# Patient Record
Sex: MALE | Race: BLACK OR AFRICAN AMERICAN
[De-identification: names, ages, dates, MRNs, and addresses within clinical notes are randomized per-mention and may not be internally consistent; named-entity substitution may affect disease eponyms.]

---

## 2018-08-08 ENCOUNTER — HOSPITAL ENCOUNTER (EMERGENCY)
Dept: HOSPITAL 62 - ER | Age: 23
Discharge: HOME | End: 2018-08-08
Payer: COMMERCIAL

## 2018-08-08 VITALS — DIASTOLIC BLOOD PRESSURE: 77 MMHG | SYSTOLIC BLOOD PRESSURE: 140 MMHG

## 2018-08-08 DIAGNOSIS — R10.13: ICD-10-CM

## 2018-08-08 DIAGNOSIS — F12.10: ICD-10-CM

## 2018-08-08 DIAGNOSIS — K29.00: Primary | ICD-10-CM

## 2018-08-08 DIAGNOSIS — F17.200: ICD-10-CM

## 2018-08-08 DIAGNOSIS — J45.909: ICD-10-CM

## 2018-08-08 DIAGNOSIS — R07.9: ICD-10-CM

## 2018-08-08 DIAGNOSIS — R13.10: ICD-10-CM

## 2018-08-08 PROCEDURE — 71046 X-RAY EXAM CHEST 2 VIEWS: CPT

## 2018-08-08 PROCEDURE — 96372 THER/PROPH/DIAG INJ SC/IM: CPT

## 2018-08-08 PROCEDURE — 99284 EMERGENCY DEPT VISIT MOD MDM: CPT

## 2018-08-08 NOTE — ER DOCUMENT REPORT
ED GI/





- General


Chief Complaint: Epigastric Pain


Stated Complaint: UPPER ABDOMINAL PAIN


Time Seen by Provider: 08/08/18 19:19


Mode of Arrival: Ambulatory


Information source: Patient


Notes: 





Chief complaint: abdominal pain:








History of complain:( obtained from----patient) 23 years old male with a 

history of on and off dysphagia, presents today with sudden onset of epigastric 

to lower chest pain, after eating a fairly large piece of chicken.  The pain is 

more of a burning sensation.  Nonradiating.  Not associated with any nausea 

vomiting or drooling.


Denies any other constitutional symptoms








Onset: Sudden onset


Duration: Prior to arrival


Severity: Moderate to severe


Quality: Burning sensation


Context: As above


Exacerbating factor and relieving factors: As above











REVIEW OF SYSTEMS:


CONSTITUTIONAL :  Denies fever,  chills, or sweats.  Denies recent illness.


EENT:   Denies eye, ear, throat, or mouth pain or symptoms.  Denies nasal or 

sinus congestion or discharge.  Denies throat, tongue, or mouth swelling or 

difficulty swallowing.


CARDIOVASCULAR:  Denies chest pain.  Denies palpitations or racing or irregular 

heart beat.  Denies ankle edema.


RESPIRATORY:  Denies cough, cold, or chest congestion.  Denies shortness of 

breath, difficulty breathing, or wheezing.


GASTROINTESTINAL:  Denies  distention.  Denies nausea, vomiting, or diarrhea.  

Denies blood in vomitus, stools, or per rectum.  Denies black, tarry stools.  

Denies constipation. 


GENITOURINARY:  Denies difficulty urinating, painful urination, burning, 

frequency, blood in urine, or discharge.


FEMALE  GENITOURINARY:  Denies vaginal bleeding, heavy or abnormal periods, 

irregular periods.  Denies vaginal discharge or odor. 


MUSCULOSKELETAL:  Denies back or neck pain or stiffness.  Denies joint pain or 

swelling.


SKIN:   Denies rash, lesions or sores.


HEMATOLOGIC :   Denies easy bruising or bleeding.


LYMPHATIC:  Denies swollen, enlarged glands.


NEUROLOGICAL:  Denies confusion or altered mental status.  Denies passing out 

or loss of consciousness.  Denies dizziness or lightheadedness.  Denies 

headache.  Denies weakness or paralysis or loss of use of either side.  Denies 

problems with gait or speech.  Denies sensory loss, numbness, or tingling.  

Denies seizures.


PSYCHIATRIC:  Denies anxiety or stress.  Denies depression, suicidal ideation, 

or homicidal ideation.





ALL OTHER SYSTEMS REVIEWED AND NEGATIVE.











PHYSICAL EXAMINATION:





GENERAL: Well-appearing, well-nourished and  moderately in acute distress.





HEAD: Atraumatic, normocephalic.





EYES: Pupils equal round and reactive to light, extraocular movements intact, 

conjunctiva are normal.





ENT: Nares patent, oropharynx clear without exudates.  Moist mucous membranes.





NECK: Normal range of motion, supple without lymphadenopathy





LUNGS: Breath sounds clear to auscultation bilaterally and equal.  No wheezes 

rales or rhonchi.





HEART: Regular rate and rhythm without murmurs





ABDOMEN: Soft, nontender, nondistended abdomen.  No guarding, no rebound.  No 

masses appreciated.





Female : deferred





Musculoskeletal: Normal range of motion, no pitting or edema.  No cyanosis.





NEUROLOGICAL: Cranial nerves grossly intact.  Normal speech, normal gait.  

Normal sensory, motor exams





PSYCH: Normal mood, normal affect.





SKIN: Warm, Dry, normal turgor, no rashes or lesions noted.

















Dictation was performed using Dragon voice recognition software


TRAVEL OUTSIDE OF THE [place] IN LAST 30 DAYS: No





- HPI


Notes: 





08/08/18 20:23


Dictated





- Related Data


Allergies/Adverse Reactions: 


 





No Known Allergies Allergy (Unverified 08/08/18 18:57)


 











Past Medical History





- Social History


Smoking Status: Current Every Day Smoker


Chew tobacco use (# tins/day): No


Frequency of alcohol use: Rare


Drug Abuse: Marijuana


Lives with: Family


Family History: Reviewed & Not Pertinent


Patient has suicidal ideation: No


Patient has homicidal ideation: No


Pulmonary Medical History: Reports: Hx Asthma


Renal/ Medical History: Denies: Hx Peritoneal Dialysis


Past Surgical History: Reports: Hx Tonsillectomy





Review of Systems





- Review of Systems


Notes: 





Dictated





Physical Exam





- Vital signs


Vitals: 





 











Temp Pulse Resp BP Pulse Ox


 


 98.1 F   97   20   140/77 H  96 


 


 08/08/18 19:04  08/08/18 19:04  08/08/18 19:04  08/08/18 19:04  08/08/18 19:04














- Notes


Notes: 





Dictated





Course





- Re-evaluation


Re-evalutation: 





08/08/18 20:24


He was given glucagon and Ativan, and also GI cocktail.  Symptoms improved.  

Discharge home with follow-up with the gastroenterologist.





- Vital Signs


Vital signs: 





 











Temp Pulse Resp BP Pulse Ox


 


 98.1 F   97   20   140/77 H  96 


 


 08/08/18 19:04  08/08/18 19:04  08/08/18 19:04  08/08/18 19:04  08/08/18 19:04














- Diagnostic Test


Radiology reviewed: Reports reviewed - Radiologist reported as questionable 

pneumonia.  But patient has no symptoms of it.





Discharge





- Discharge


Clinical Impression: 


Gastritis


Qualifiers:


 Gastritis type: unspecified gastritis Chronicity: acute Gastritis bleeding: 

without bleeding Qualified Code(s): K29.00 - Acute gastritis without bleeding





Dysphagia


Qualifiers:


 Dysphagia type: esophageal phase Qualified Code(s): R13.10 - Dysphagia, 

unspecified





Condition: Fair


Disposition: HOME, SELF-CARE


Instructions:  Dysphagia ([facility]), Antacid Therapy ([facility])


Prescriptions: 


Lansoprazole [Prevacid 30 Mg Odt Tablet] 30 mg PO ACBRKFST #30 tab.rap


Sucralfate [Carafate 1 gm Tablet] 1 gm PO ACHS #30 tablet


Referrals: 


LOCALMD,NO [Primary Care Provider] - Follow up as needed

## 2018-08-08 NOTE — RADIOLOGY REPORT (SQ)
EXAM DESCRIPTION:  CHEST 2 VIEWS



COMPLETED DATE/TIME:  8/8/2018 7:47 pm



REASON FOR STUDY:  chest pain



COMPARISON:  None.



EXAM PARAMETERS:  NUMBER OF VIEWS: two views

TECHNIQUE: Digital Frontal and Lateral radiographic views of the chest acquired.

RADIATION DOSE: NA

LIMITATIONS: none



FINDINGS:  LUNGS AND PLEURA: Ill-defined retrocardiac opacity on the left.  Cannot exclude a minimal 
infiltrate seen posteriorly and inferiorly on the lateral view.

MEDIASTINUM AND HILAR STRUCTURES: No masses or contour abnormalities.

HEART AND VASCULAR STRUCTURES: Heart normal size.  No evidence for failure.

BONES: No acute findings.

HARDWARE: None in the chest.

OTHER: No other significant finding.



IMPRESSION:  Possible limited left lower lobe pneumonia.



TECHNICAL DOCUMENTATION:  JOB ID:  [id number]

 2011 [employer]- All Rights Reserved



Reading location - IP/workstation name: [email or web address]

## 2018-08-09 ENCOUNTER — HOSPITAL ENCOUNTER (EMERGENCY)
Dept: HOSPITAL 62 - ER | Age: 23
Discharge: ANOTHER HOSPITAL | End: 2018-08-09
Payer: COMMERCIAL

## 2018-08-09 VITALS — DIASTOLIC BLOOD PRESSURE: 51 MMHG | SYSTOLIC BLOOD PRESSURE: 129 MMHG

## 2018-08-09 DIAGNOSIS — K22.3: ICD-10-CM

## 2018-08-09 DIAGNOSIS — R00.0: ICD-10-CM

## 2018-08-09 DIAGNOSIS — R10.10: ICD-10-CM

## 2018-08-09 DIAGNOSIS — E86.0: Primary | ICD-10-CM

## 2018-08-09 LAB
ADD MANUAL DIFF: NO
ALBUMIN SERPL-MCNC: 4.8 G/DL (ref 3.5–5)
ALP SERPL-CCNC: 54 U/L (ref 38–126)
ALT SERPL-CCNC: 44 U/L (ref 21–72)
ANION GAP SERPL CALC-SCNC: 21 MMOL/L (ref 5–19)
AST SERPL-CCNC: 45 U/L (ref 17–59)
BASOPHILS # BLD AUTO: 0 10^3/UL (ref 0–0.2)
BASOPHILS NFR BLD AUTO: 0.1 % (ref 0–2)
BILIRUB DIRECT SERPL-MCNC: 0.3 MG/DL (ref 0–0.4)
BILIRUB SERPL-MCNC: 1.9 MG/DL (ref 0.2–1.3)
BUN SERPL-MCNC: 12 MG/DL (ref 7–20)
CALCIUM: 10.6 MG/DL (ref 8.4–10.2)
CHLORIDE SERPL-SCNC: 107 MMOL/L (ref 98–107)
CK MB SERPL-MCNC: 4.26 NG/ML (ref ?–4.55)
CK SERPL-CCNC: 1001 U/L (ref 55–170)
CO2 SERPL-SCNC: 19 MMOL/L (ref 22–30)
EOSINOPHIL # BLD AUTO: 0 10^3/UL (ref 0–0.6)
EOSINOPHIL NFR BLD AUTO: 0 % (ref 0–6)
ERYTHROCYTE [DISTWIDTH] IN BLOOD BY AUTOMATED COUNT: 14.3 % (ref 11.5–14)
GLUCOSE SERPL-MCNC: 135 MG/DL (ref 75–110)
HCT VFR BLD CALC: 49.4 % (ref 37.9–51)
HGB BLD-MCNC: 16.6 G/DL (ref 13.5–17)
LIPASE SERPL-CCNC: 47.2 U/L (ref 23–300)
LYMPHOCYTES # BLD AUTO: 0.5 10^3/UL (ref 0.5–4.7)
LYMPHOCYTES NFR BLD AUTO: 7.5 % (ref 13–45)
MCH RBC QN AUTO: 27.4 PG (ref 27–33.4)
MCHC RBC AUTO-ENTMCNC: 33.6 G/DL (ref 32–36)
MCV RBC AUTO: 81 FL (ref 80–97)
MONOCYTES # BLD AUTO: 0.4 10^3/UL (ref 0.1–1.4)
MONOCYTES NFR BLD AUTO: 5.2 % (ref 3–13)
NEUTROPHILS # BLD AUTO: 6 10^3/UL (ref 1.7–8.2)
NEUTS SEG NFR BLD AUTO: 87.2 % (ref 42–78)
PLATELET # BLD: 258 10^3/UL (ref 150–450)
POTASSIUM SERPL-SCNC: 4.5 MMOL/L (ref 3.6–5)
PROT SERPL-MCNC: 8 G/DL (ref 6.3–8.2)
RBC # BLD AUTO: 6.07 10^6/UL (ref 4.35–5.55)
SODIUM SERPL-SCNC: 146.5 MMOL/L (ref 137–145)
TOTAL CELLS COUNTED % (AUTO): 100 %
TROPONIN I SERPL-MCNC: < 0.012 NG/ML
WBC # BLD AUTO: 6.9 10^3/UL (ref 4–10.5)

## 2018-08-09 PROCEDURE — 36415 COLL VENOUS BLD VENIPUNCTURE: CPT

## 2018-08-09 PROCEDURE — 74150 CT ABDOMEN W/O CONTRAST: CPT

## 2018-08-09 PROCEDURE — 93005 ELECTROCARDIOGRAM TRACING: CPT

## 2018-08-09 PROCEDURE — 84484 ASSAY OF TROPONIN QUANT: CPT

## 2018-08-09 PROCEDURE — 71045 X-RAY EXAM CHEST 1 VIEW: CPT

## 2018-08-09 PROCEDURE — 85025 COMPLETE CBC W/AUTO DIFF WBC: CPT

## 2018-08-09 PROCEDURE — 96361 HYDRATE IV INFUSION ADD-ON: CPT

## 2018-08-09 PROCEDURE — 83690 ASSAY OF LIPASE: CPT

## 2018-08-09 PROCEDURE — 96376 TX/PRO/DX INJ SAME DRUG ADON: CPT

## 2018-08-09 PROCEDURE — 96365 THER/PROPH/DIAG IV INF INIT: CPT

## 2018-08-09 PROCEDURE — 93010 ELECTROCARDIOGRAM REPORT: CPT

## 2018-08-09 PROCEDURE — 99291 CRITICAL CARE FIRST HOUR: CPT

## 2018-08-09 PROCEDURE — 82553 CREATINE MB FRACTION: CPT

## 2018-08-09 PROCEDURE — 71250 CT THORAX DX C-: CPT

## 2018-08-09 PROCEDURE — 82550 ASSAY OF CK (CPK): CPT

## 2018-08-09 PROCEDURE — 80053 COMPREHEN METABOLIC PANEL: CPT

## 2018-08-09 PROCEDURE — 96375 TX/PRO/DX INJ NEW DRUG ADDON: CPT

## 2018-08-09 PROCEDURE — 74220 X-RAY XM ESOPHAGUS 1CNTRST: CPT

## 2018-08-09 NOTE — RADIOLOGY REPORT (SQ)
EXAM DESCRIPTION:  CT CHEST WITHOUT; CT ABDOMEN NO ORAL OR IV



COMPLETED DATE/TIME:  8/9/2018 11:26 am



REASON FOR STUDY:  Mediastinal free air, upper abdominal pain



COMPARISON:  Chest film 8/9/2018, esophagram 8/9/2018.



TECHNIQUE:  CT scan of the chest performed without intravenous contrast using helical scanning techni
que.  Images reviewed with lung, soft tissue and bone windows. Reconstructed coronal and sagittal MPR
 images reviewed.  All images stored on PACS.

CT scan of the abdomen performed without intravenous contrast and immediately post upper GI/ omnipaqu
e swallow using helical scanning technique with dynamic intravenous contrast injection.  Images revie
wed with lung, soft tissue and bone windows.  Reconstructed coronal and sagittal MPR images reviewed.
  All images stored on PACS.

All CT scanners at this facility use dose modulation, iterative reconstruction, and/or weight based d
osing when appropriate to reduce radiation dose to as low as reasonably achievable (ALARA).

[facility]: Dose Right  [facility]: CareDose    [facility]: Dose Right    [facility]: Teradose 4D    [facility]: Smart Technologies



RADIATION DOSE:  CT Rad equipment meets quality standard of care and radiation dose reduction techniq
ues were employed. CTDIvol: 22.2 - 25.6 mGy. DLP: 2080 mGy-cm. mGy.



LIMITATIONS:  No technical limitations.



FINDINGS:  This study was performed immediately after a swallowing fluoroscopic study with Omnipaque 
300.

 On the fluoroscopic study, there is immediate leakage of contrast from the distal esophagus at the G
E junction at the level of the hemidiaphragm, fluoroscopically there is accumulation of contrast both
 above and below the left hemidiaphragm.

On the CT chest and abdomen, there is still a small amount of water-soluble contrast within the esoph
ageal lumen. At the level of T7, there is extravasation of oral contrast from the esophagus into the 
posterior mediastinal soft tissues, with a pocket of air and contrast measuring about 8 x 5 cm in siz
e.  Extravasated contrast tracks around the lateral aspect of the descending thoracic aorta.  There i
s tracking of contrast into the upper abdominal cavity through the esophageal hiatus, with a second p
ocket of extraluminal air and contrast tracking between the gastric cardia and spleen, measuring abou
t 8 x 7 cm in size.

Retroperitoneal air bubbles track into the left anterior pararenal space ventral to the left kidney. 
 No extravasated contrast is seen this far inferiorly.

There are small bilateral pleural effusions.  Minimal bibasilar atelectasis.  Moderate air in the med
iastinum along the pericardium, extending up through the thoracic inlet, and into the supraclavicular
 soft tissues and deep neck soft tissues.  Small amount of air in the epidural veins along the cervic
al spinal canal.

These findings were called to Dr. Lynch in the emergency room, 1115 hours, 8/9/2018.

CHEST:

AXILLAE: No adenopathy.

CHEST WALL: Supraclavicular air bilaterally

LUNGS: Minimal bibasilar atelectasis

PLEURA: Small bilateral pleural effusions

THYROID: No masses or significant asymmetry.

HILAR AND MEDIASTINAL STRUCTURES: As above

AORTA AND GREAT VESSELS: No aneurysm.

HEART: Air in the mediastinum surrounding the heart

HARDWARE AND LIFELINES: None.

BONES: No significant finding.

OTHER: No other significant finding.

ABDOMEN AND PELVIS:

LIVER: Normal size. No masses.  No dilated ducts.

SPLEEN: Normal size.  No focal lesions.

PANCREAS: No masses.  No significant calcifications.  No adjacent inflammation or peripancreatic flui
d collections.  Pancreatic duct not dilated.

GALLBLADDER: No identified stones by CT criteria. No inflammatory changes to suggest cholecystitis.

ADRENAL GLANDS: No significant masses or asymmetry.

RIGHT KIDNEY AND URETER: No solid masses. Assessment limited by lack of IV contrast. No significant c
alcification. No hydronephrosis or hydroureter.

LEFT KIDNEY AND URETER: No solid masses. Assessment limited by lack of IV contrast. No significant ca
lcification. No hydronephrosis or hydroureter.  There is air in the left retroperitoneal fat along th
e left anterior pararenal space.

AORTA AND VESSELS: No aneurysm.

RETROPERITONEUM: Extravasated oral contrast from distal esophageal perforation, extending into the so
ft tissues around the gastric cardia, and into the retroperitoneum between the GE junction and spleen
.

APPENDIX: Not in the field of view

LARGE AND SMALL BOWEL: No dilatation.  No masses.  No wall thickening.

ABDOMINAL WALL: No hernia or masses.

PERITONEAL CAVITY: No free air.  No free fluid.  No peritoneal implants or masses.

BONES:  Unremarkable.

OTHER: No other significant finding.



IMPRESSION:  Leakage of water-soluble oral contrast from a distal esophageal perforation, extending b
oth into the mediastinum as well as into the left upper quadrant retroperitoneum.



COMMENT:   Pertinent findings on the imaging study reported as a CRITICAL RESULT to LIO LYNCH MD
 at11:15 on 8/9/2018.

Category of Critical Result: Perforated esophagus



TECHNICAL DOCUMENTATION:  JOB ID:  [id number]

Quality ID # 436: Final reports with documentation of one or more dose reduction techniques (e.g., Au
tomated exposure control, adjustment of the mA and/or kV according to patient size, use of iterative 
reconstruction technique)

 2011 [employer]- All Rights Reserved



Reading location - IP/workstation name: [email or web address]

## 2018-08-09 NOTE — ER DOCUMENT REPORT
ED GI/





<LIO LYNCH - Last Filed: 08/09/18 11:51>





- General


Mode of Arrival: Ambulatory


Information source: Patient


TRAVEL OUTSIDE OF THE [place] IN LAST 30 DAYS: No





<CIRILO CORLEY - Last Filed: 08/09/18 12:28>





- General


Chief Complaint: Abdominal Pain


Stated Complaint: STOMACH PAIN


Time Seen by Provider: 08/09/18 07:54


Notes: 





23-year-old male who presents to the emergency department today with complaints 

of upper abdominal pain.  Patient was seen yesterday for a similar pain and 

given glucagon, GI cocktail, and Ativan.  Patient states at that time his 

symptoms seemed to subside until getting home at about 2100.  Patient states 

when the symptoms began again at 2100 there were sharp, stabbing, and 

persistent pain.  Patient states it hurts when he breathes in. Patient states 

he has not been drinking anything because after consuming liquids he has an 

intense pain for 30-45 minutes.  Patient denies vomiting. (CIRILO CORLEY)





- Related Data


Allergies/Adverse Reactions: 


 





No Known Allergies Allergy (Verified 08/09/18 07:34)


 











Past Medical History





- General


Information source: Patient





- Social History


Smoking Status: Unknown if Ever Smoked


Chew tobacco use (# tins/day): No


Frequency of alcohol use: None


Drug Abuse: None


Family History: Reviewed & Not Pertinent


Patient has suicidal ideation: No


Patient has homicidal ideation: No


Pulmonary Medical History: Reports: Hx Asthma


Renal/ Medical History: Denies: Hx Peritoneal Dialysis


Past Surgical History: Reports: Hx Tonsillectomy





<CIRILO CORLEY - Last Filed: 08/09/18 12:28>





Review of Systems





- Review of Systems


Constitutional: No symptoms reported


EENT: No symptoms reported


Cardiovascular: No symptoms reported


Respiratory: No symptoms reported


Gastrointestinal: See HPI, Abdominal pain - upper abdominal pain


Genitourinary: No symptoms reported


Male Genitourinary: No symptoms reported


Musculoskeletal: No symptoms reported


Skin: No symptoms reported


Hematologic/Lymphatic: No symptoms reported


Neurological/Psychological: No symptoms reported


-: Yes All other systems reviewed and negative





<CIRILO CORLEY - Last Filed: 08/09/18 12:28>





Physical Exam





<LIO LYNCH - Last Filed: 08/09/18 11:51>





<CIRILO CORLEY - Last Filed: 08/09/18 12:28>





- Vital signs


Vitals: 


 











Temp Pulse Resp BP Pulse Ox


 


 99.4 F   142 H  22 H  150/56 H  96 


 


 08/09/18 07:42  08/09/18 07:42  08/09/18 07:42  08/09/18 07:42  08/09/18 07:42














- Notes


Notes: 





Physical Exam:


 


General: Alert, appears well. 


 


HEENT: Normocephalic. Atraumatic. PERRL. Extraocular movements intact. 

Oropharynx clear. Dry mucous membranes.


 


Neck: Supple. Non-tender.


 


Respiratory: Tachypneic into the 30s, shallow breathing with inspiratory phase 

cut off secondary to pain.  No right upper quadrant tenderness to palpation.  

Clear and equal breath sounds bilaterally.


 


Cardiovascular: Regular rate and rhythm. 


 


Abdominal: Mid to upper abdominal tenderness with palpation. No distension. 

Normal Bowel Sounds. 


 


Back: Non-tender. No deformity or step off.


 


Extremities: Moves all four extremities.


Upper extremities: Normal inspection. Normal ROM.  


Lower extremities: Normal inspection. No edema. Normal ROM.


 


Neurological: Normal cognition. AAOx4. Normal speech.  


 


Psychological: Normal affect. Normal Mood. 


 


Skin: Warm. Dry. Normal color. (CIRILO CORLEY)





Course





- Laboratory


Result Diagrams: 


 08/09/18 08:18





 08/09/18 08:18





- Diagnostic Test


Radiology reviewed: Reports reviewed - Esophageal rupture





- EKG Interpretation by Me


EKG shows normal: Sinus rhythm, Axis, Intervals, QRS Complexes, ST-T Waves


Rate: Tachycardia - 132


Rhythm: PVC's - Ventricular trigeminy





- Consults


  ** Dr. Cristina


Time consulted: 11:45


Consulted provider: other - Will accept at [facility]--plan to be direct to the OR.





<LIO LYNHC - Last Filed: 08/09/18 11:51>





- Laboratory


Result Diagrams: 


 08/09/18 08:18





 08/09/18 08:18





<CIRILO CORLEY - Last Filed: 08/09/18 12:28>





- Vital Signs


Vital signs: 


 











Temp Pulse Resp BP Pulse Ox


 


 99.4 F   142 H  33 H  150/56 H  96 


 


 08/09/18 07:42  08/09/18 07:42  08/09/18 11:54  08/09/18 07:42  08/09/18 11:54














- Laboratory


Laboratory results interpreted by me: 


 











  08/09/18 08/09/18 08/09/18





  08:18 08:18 08:18


 


RBC  6.07 H  


 


RDW  14.3 H  


 


Seg Neutrophils %  87.2 H  


 


Lymphocytes %  7.5 L  


 


Sodium   146.5 H 


 


Carbon Dioxide   19 L 


 


Anion Gap   21 H 


 


Creatinine   1.30 H 


 


Glucose   135 H 


 


Calcium   10.6 H 


 


Total Bilirubin   1.9 H 


 


Creatine Kinase    1001 H














Critical Care Note





- Critical Care Note


Total time excluding time spent on procedures (mins): 40





<LIO LYNCH - Last Filed: 08/09/18 11:51>





Discharge





<LIO LYNCH - Last Filed: 08/09/18 11:51>





<CIRILO CORLEY - Last Filed: 08/09/18 12:28>





- Discharge


Clinical Impression: 


 Esophageal rupture, Tachycardia, Dehydration





Scribe Attestation: 





08/09/18 10:21


I personally performed the services described in the documentation, reviewed 

and edited the documentation which was dictated to the scribe in my presence, 

and it accurately records my words and actions. (CIRILO CORLEY)





Scribe Documentation





- Scribe


Written by Scribe:: Aleksandr Mason, 8/9/2018 1228


acting as scribe for :: Karena





<CIRILO CORLEY - Last Filed: 08/09/18 12:28>

## 2018-08-09 NOTE — RADIOLOGY REPORT (SQ)
EXAM DESCRIPTION:  CT CHEST WITHOUT; CT ABDOMEN NO ORAL OR IV



COMPLETED DATE/TIME:  8/9/2018 11:26 am



REASON FOR STUDY:  Mediastinal free air, upper abdominal pain



COMPARISON:  Chest film 8/9/2018, esophagram 8/9/2018.



TECHNIQUE:  CT scan of the chest performed without intravenous contrast using helical scanning techni
que.  Images reviewed with lung, soft tissue and bone windows. Reconstructed coronal and sagittal MPR
 images reviewed.  All images stored on PACS.

CT scan of the abdomen performed without intravenous contrast and immediately post upper GI/ omnipaqu
e swallow using helical scanning technique with dynamic intravenous contrast injection.  Images revie
wed with lung, soft tissue and bone windows.  Reconstructed coronal and sagittal MPR images reviewed.
  All images stored on PACS.

All CT scanners at this facility use dose modulation, iterative reconstruction, and/or weight based d
osing when appropriate to reduce radiation dose to as low as reasonably achievable (ALARA).

[facility]: Dose Right  [facility]: CareDose    [facility]: Dose Right    [facility]: Teradose 4D    [facility]: [employer]



RADIATION DOSE:  CT Rad equipment meets quality standard of care and radiation dose reduction techniq
ues were employed. CTDIvol: 22.2 - 25.6 mGy. DLP: 2080 mGy-cm. mGy.



LIMITATIONS:  No technical limitations.



FINDINGS:  This study was performed immediately after a swallowing fluoroscopic study with Omnipaque 
300.

 On the fluoroscopic study, there is immediate leakage of contrast from the distal esophagus at the G
E junction at the level of the hemidiaphragm, fluoroscopically there is accumulation of contrast both
 above and below the left hemidiaphragm.

On the CT chest and abdomen, there is still a small amount of water-soluble contrast within the esoph
ageal lumen. At the level of T7, there is extravasation of oral contrast from the esophagus into the 
posterior mediastinal soft tissues, with a pocket of air and contrast measuring about 8 x 5 cm in siz
e.  Extravasated contrast tracks around the lateral aspect of the descending thoracic aorta.  There i
s tracking of contrast into the upper abdominal cavity through the esophageal hiatus, with a second p
ocket of extraluminal air and contrast tracking between the gastric cardia and spleen, measuring abou
t 8 x 7 cm in size.

Retroperitoneal air bubbles track into the left anterior pararenal space ventral to the left kidney. 
 No extravasated contrast is seen this far inferiorly.

There are small bilateral pleural effusions.  Minimal bibasilar atelectasis.  Moderate air in the med
iastinum along the pericardium, extending up through the thoracic inlet, and into the supraclavicular
 soft tissues and deep neck soft tissues.  Small amount of air in the epidural veins along the cervic
al spinal canal.

These findings were called to Dr. Lynch in the emergency room, 1115 hours, 8/9/2018.

CHEST:

AXILLAE: No adenopathy.

CHEST WALL: Supraclavicular air bilaterally

LUNGS: Minimal bibasilar atelectasis

PLEURA: Small bilateral pleural effusions

THYROID: No masses or significant asymmetry.

HILAR AND MEDIASTINAL STRUCTURES: As above

AORTA AND GREAT VESSELS: No aneurysm.

HEART: Air in the mediastinum surrounding the heart

HARDWARE AND LIFELINES: None.

BONES: No significant finding.

OTHER: No other significant finding.

ABDOMEN AND PELVIS:

LIVER: Normal size. No masses.  No dilated ducts.

SPLEEN: Normal size.  No focal lesions.

PANCREAS: No masses.  No significant calcifications.  No adjacent inflammation or peripancreatic flui
d collections.  Pancreatic duct not dilated.

GALLBLADDER: No identified stones by CT criteria. No inflammatory changes to suggest cholecystitis.

ADRENAL GLANDS: No significant masses or asymmetry.

RIGHT KIDNEY AND URETER: No solid masses. Assessment limited by lack of IV contrast. No significant c
alcification. No hydronephrosis or hydroureter.

LEFT KIDNEY AND URETER: No solid masses. Assessment limited by lack of IV contrast. No significant ca
lcification. No hydronephrosis or hydroureter.  There is air in the left retroperitoneal fat along th
e left anterior pararenal space.

AORTA AND VESSELS: No aneurysm.

RETROPERITONEUM: Extravasated oral contrast from distal esophageal perforation, extending into the so
ft tissues around the gastric cardia, and into the retroperitoneum between the GE junction and spleen
.

APPENDIX: Not in the field of view

LARGE AND SMALL BOWEL: No dilatation.  No masses.  No wall thickening.

ABDOMINAL WALL: No hernia or masses.

PERITONEAL CAVITY: No free air.  No free fluid.  No peritoneal implants or masses.

BONES:  Unremarkable.

OTHER: No other significant finding.



IMPRESSION:  Leakage of water-soluble oral contrast from a distal esophageal perforation, extending b
oth into the mediastinum as well as into the left upper quadrant retroperitoneum.



COMMENT:   Pertinent findings on the imaging study reported as a CRITICAL RESULT to LIO LYNCH MD
 at11:15 on 8/9/2018.

Category of Critical Result: Perforated esophagus



TECHNICAL DOCUMENTATION:  JOB ID:  [id number]

Quality ID # 436: Final reports with documentation of one or more dose reduction techniques (e.g., Au
tomated exposure control, adjustment of the mA and/or kV according to patient size, use of iterative 
reconstruction technique)

 2011 [employer]- All Rights Reserved



Reading location - IP/workstation name: [email or web address]

## 2018-08-09 NOTE — ER DOCUMENT REPORT
ED Medical Screen (RME)





- General


Chief Complaint: Abdominal Pain


Stated Complaint: STOMACH PAIN


Time Seen by Provider: 08/09/18 07:54


Mode of Arrival: Ambulatory


Information source: Patient


Notes: 





Patient presents to emergency department with complaints of epigastric right 

upper quadrant abdominal pain.  Mom reports it started after last night when he 

choked kind of after eating.  She reports he has had this type episodes before 

but never had this abdominal pain with it.  Complains of pain denies nausea.


TRAVEL OUTSIDE OF THE [place] IN LAST 30 DAYS: No





- Related Data


Allergies/Adverse Reactions: 


 





No Known Allergies Allergy (Verified 08/09/18 07:34)


 











Past Medical History


Pulmonary Medical History: Reports: Hx Asthma


Renal/ Medical History: Denies: Hx Peritoneal Dialysis


Past Surgical History: Reports: Hx Tonsillectomy

## 2018-08-09 NOTE — RADIOLOGY REPORT (SQ)
EXAM DESCRIPTION:  CHEST SINGLE VIEW



COMPLETED DATE/TIME:  8/9/2018 10:10 am



REASON FOR STUDY:  Inspiratory chest pain



COMPARISON:  AP chest 8/8/2018, 2001 hours



EXAM PARAMETERS:  NUMBER OF VIEWS: One view.

TECHNIQUE: Single frontal radiographic view of the chest acquired.

RADIATION DOSE: NA

LIMITATIONS: None.



FINDINGS:  LUNGS AND PLEURA: Left retrocardiac consolidation is present.  No gross pleural effusions.
  No pneumothorax.

MEDIASTINUM AND HILAR STRUCTURES: Pneumomediastinum is present air along the left heart border.  This
 extends up into the upper mediastinum and bilateral supraclavicular regions, similar compared to 8/8
/2018 chest film.  This finding was discussed with Dr. Thurston in the emergency room.

HEART AND VASCULAR STRUCTURES: Heart normal in size.  Normal vasculature.

BONES: No acute findings.

HARDWARE: None in the chest.

OTHER: No other significant finding.



IMPRESSION:  Pneumomediastinum with air in the supraclavicular regions.  Left retrocardiac consolidat
ion.  Question esophageal injury.  Findings discussed with Dr. Thurston in the emergency room.  I water-
soluble contrast esophagram followed by CT chest will be performed shortly.



TECHNICAL DOCUMENTATION:  JOB ID:  [id number]

 2011 [employer]- All Rights Reserved



Reading location - IP/workstation name: [facility]-[email or web address]

## 2018-08-09 NOTE — RADIOLOGY REPORT (SQ)
EXAM DESCRIPTION:  BARIUM SWALLOW ESOPHAGUS



COMPLETED DATE/TIME:  8/9/2018 11:29 am



REASON FOR STUDY:  water soluble contrast



COMPARISON:   Prior chest x-rays today and last night



TECHNIQUE:  Under fluoroscopic guidance, patient ingested Omnipaque 300. Fluoroscopic spot images and
 routine radiographic images acquired and stored on PACS.

12 MM BARIUM TABLET GIVEN: No



LIMITATIONS:  None.



FLUOROSCOPY TIME:  1 minutes 7 seconds

7 series of digital images saved to PACS.



FINDINGS:  NEUROMUSCULAR COORDINATION OF SWALLOW: Normal. No aspiration.

ESOPHAGEAL MOTILITY: Normal peristalsis. No esophageal spasm.

ESOPHAGEAL MUCOSA: Grossly unremarkable

GASTRO-ESOPHAGEAL JUNCTION: There is extravasation of the Omnipaque 300 from the esophagus at the GE 
junction.  Accumulation of contrast is seen immediately, in the lower mediastinum and upper abdominal
 cavity paralleling the lesser curvature of stomach.  There are abnormal air bubbles in the left uppe
r quadrant retroperitoneum related to perforation.  Consolidation with air-fluid level in the left re
trocardiac region is also present.

NON-GI TRACT STRUCTURES: Pneumomediastinum is present

OTHER: This report was called to Dr. Thurston



IMPRESSION:  Esophageal leak at the GE junction, with extravasated contrast spilling into the left re
trocardiac region in the mediastinum, and extending into the subdiaphragmatic left upper quadrant ret
roperitoneum.



COMMENT:  Quality :  Final reports for procedures using fluoroscopy that document radiation exp
osure indices, or exposure time and number of fluorographic images (if radiation exposure indices are
 not available)



TECHNICAL DOCUMENTATION:  JOB ID:  [id number]

 2011 [employer]- All Rights Reserved



Reading location - IP/workstation name: [facility]

## 2018-08-09 NOTE — EKG REPORT
SEVERITY:- ABNORMAL ECG -

SINUS TACHYCARDIA

VENTRICULAR TRIGEMINY

:

Confirmed by: Vernell Jacob MD 09-Aug-2018 11:34:23